# Patient Record
(demographics unavailable — no encounter records)

---

## 2025-03-25 NOTE — HISTORY OF PRESENT ILLNESS
[FreeTextEntry1] : Mr. 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 He within the past several months underwent a lumbar laminectomy. Post-op he c/o mild difficulty swallowing and a cough. Saw Dr. Eagle, underwent a TNE showing findings concerning for Ingram's. States he has had "acid problems" most of his life, although his symptoms have improved recently. Has not been taking anything for his symptoms. Has been mainly heartburn. No dysphagia, odynophagia recently, difficulty swallowing improved over the past week or so. Has undergone an EGD in the distant past in addition to colonoscopies "regularly," normal as per his report. Does not smoke or drink. No FHx of any GI malignancies.

## 2025-03-25 NOTE — ASSESSMENT
[FreeTextEntry1] : Will plan on an upper endoscopy for suspect Ingram's esophagus on recent TNE.  Explained risks/benefits/alternatives including not limited to bleeding, infection, perforation, missed lesions, anesthesia complications.  Patient understands and agrees, all questions answered. Thank you for referring Mr. FARMER.  Please do not hesitate to call to further discuss his/her care.  Note faxed to requesting MD.